# Patient Record
Sex: MALE | Race: WHITE
[De-identification: names, ages, dates, MRNs, and addresses within clinical notes are randomized per-mention and may not be internally consistent; named-entity substitution may affect disease eponyms.]

---

## 2020-06-11 ENCOUNTER — HOSPITAL ENCOUNTER (OUTPATIENT)
Dept: HOSPITAL 53 - M WUC | Age: 85
End: 2020-06-11
Attending: PHYSICIAN ASSISTANT
Payer: COMMERCIAL

## 2020-06-11 DIAGNOSIS — J90: ICD-10-CM

## 2020-06-11 DIAGNOSIS — J18.9: Primary | ICD-10-CM

## 2020-06-11 LAB
ALBUMIN SERPL BCG-MCNC: 3.5 GM/DL (ref 3.2–5.2)
ALT SERPL W P-5'-P-CCNC: 26 U/L (ref 12–78)
BASOPHILS # BLD AUTO: 0 10^3/UL (ref 0–0.2)
BASOPHILS NFR BLD AUTO: 0.7 % (ref 0–1)
BILIRUB SERPL-MCNC: 0.9 MG/DL (ref 0.2–1)
BUN SERPL-MCNC: 22 MG/DL (ref 7–18)
CALCIUM SERPL-MCNC: 8.9 MG/DL (ref 8.8–10.2)
CHLORIDE SERPL-SCNC: 106 MEQ/L (ref 98–107)
CO2 SERPL-SCNC: 28 MEQ/L (ref 21–32)
CREAT SERPL-MCNC: 1.16 MG/DL (ref 0.7–1.3)
EOSINOPHIL # BLD AUTO: 0.1 10^3/UL (ref 0–0.5)
EOSINOPHIL NFR BLD AUTO: 2.3 % (ref 0–3)
GFR SERPL CREATININE-BSD FRML MDRD: > 60 ML/MIN/{1.73_M2} (ref 35–?)
GLUCOSE SERPL-MCNC: 92 MG/DL (ref 70–100)
HCT VFR BLD AUTO: 39.1 % (ref 42–52)
HGB BLD-MCNC: 13.1 G/DL (ref 13.5–17.5)
LYMPHOCYTES # BLD AUTO: 1.1 10^3/UL (ref 1.5–5)
LYMPHOCYTES NFR BLD AUTO: 19.9 % (ref 24–44)
MAGNESIUM SERPL-MCNC: 2.2 MG/DL (ref 1.8–2.4)
MCH RBC QN AUTO: 31.4 PG (ref 27–33)
MCHC RBC AUTO-ENTMCNC: 33.5 G/DL (ref 32–36.5)
MCV RBC AUTO: 93.8 FL (ref 80–96)
MONOCYTES # BLD AUTO: 0.5 10^3/UL (ref 0–0.8)
MONOCYTES NFR BLD AUTO: 9.3 % (ref 0–5)
NEUTROPHILS # BLD AUTO: 3.9 10^3/UL (ref 1.5–8.5)
NEUTROPHILS NFR BLD AUTO: 67.6 % (ref 36–66)
NT-PRO BNP: 2388 PG/ML (ref ?–450)
PLATELET # BLD AUTO: 193 10^3/UL (ref 150–450)
POTASSIUM SERPL-SCNC: 4.4 MEQ/L (ref 3.5–5.1)
PROT SERPL-MCNC: 6.7 GM/DL (ref 6.4–8.2)
RBC # BLD AUTO: 4.17 10^6/UL (ref 4.3–6.1)
SODIUM SERPL-SCNC: 138 MEQ/L (ref 136–145)
WBC # BLD AUTO: 5.7 10^3/UL (ref 4–10)

## 2020-06-11 PROCEDURE — 80053 COMPREHEN METABOLIC PANEL: CPT

## 2020-06-11 PROCEDURE — 83880 ASSAY OF NATRIURETIC PEPTIDE: CPT

## 2020-06-11 PROCEDURE — 71046 X-RAY EXAM CHEST 2 VIEWS: CPT

## 2020-06-11 PROCEDURE — 83735 ASSAY OF MAGNESIUM: CPT

## 2020-06-11 PROCEDURE — 85025 COMPLETE CBC W/AUTO DIFF WBC: CPT

## 2020-06-11 PROCEDURE — 36415 COLL VENOUS BLD VENIPUNCTURE: CPT

## 2020-06-11 NOTE — REP
Two-view chest:  06/11/2020.

 

Indication:  Dyspnea.

 

Comparison:  05/19/2016.

 

Findings:  There is a right lower lobe opacity.  Tiny right pleural effusion is

present.  There is no pneumothorax.  Cardiac silhouette is normal in size.

 

Impression:

 

Right lower lobe pneumonia and tiny pleural effusion.

 

 

Electronically Signed by

Enzo Schuster DO 06/11/2020 03:34 P

## 2020-06-16 ENCOUNTER — HOSPITAL ENCOUNTER (EMERGENCY)
Dept: HOSPITAL 53 - M ED | Age: 85
Discharge: HOME | End: 2020-06-16
Payer: COMMERCIAL

## 2020-06-16 VITALS — WEIGHT: 155.32 LBS | BODY MASS INDEX: 23.54 KG/M2 | HEIGHT: 68 IN

## 2020-06-16 VITALS — DIASTOLIC BLOOD PRESSURE: 93 MMHG | SYSTOLIC BLOOD PRESSURE: 148 MMHG

## 2020-06-16 DIAGNOSIS — N40.1: ICD-10-CM

## 2020-06-16 DIAGNOSIS — I48.91: ICD-10-CM

## 2020-06-16 DIAGNOSIS — R33.9: Primary | ICD-10-CM

## 2020-06-16 DIAGNOSIS — Z79.01: ICD-10-CM

## 2020-06-16 DIAGNOSIS — Z79.899: ICD-10-CM

## 2020-06-16 LAB
BASOPHILS # BLD AUTO: 0.1 10^3/UL (ref 0–0.2)
BASOPHILS NFR BLD AUTO: 1 % (ref 0–1)
EOSINOPHIL # BLD AUTO: 0.1 10^3/UL (ref 0–0.5)
EOSINOPHIL NFR BLD AUTO: 2.1 % (ref 0–3)
HCT VFR BLD AUTO: 43.8 % (ref 42–52)
HGB BLD-MCNC: 14.3 G/DL (ref 13.5–17.5)
LYMPHOCYTES # BLD AUTO: 1.3 10^3/UL (ref 1.5–5)
LYMPHOCYTES NFR BLD AUTO: 18.7 % (ref 24–44)
MCH RBC QN AUTO: 30.7 PG (ref 27–33)
MCHC RBC AUTO-ENTMCNC: 32.6 G/DL (ref 32–36.5)
MCV RBC AUTO: 94 FL (ref 80–96)
MONOCYTES # BLD AUTO: 0.6 10^3/UL (ref 0–0.8)
MONOCYTES NFR BLD AUTO: 9 % (ref 0–5)
NEUTROPHILS # BLD AUTO: 4.7 10^3/UL (ref 1.5–8.5)
NEUTROPHILS NFR BLD AUTO: 68.9 % (ref 36–66)
PLATELET # BLD AUTO: 219 10^3/UL (ref 150–450)
RBC # BLD AUTO: 4.66 10^6/UL (ref 4.3–6.1)
WBC # BLD AUTO: 6.8 10^3/UL (ref 4–10)

## 2020-06-16 NOTE — REP
Clinical:  Flank pain and urinary retention.

 

Technique:  Real time gray scale ultrasound examination using curved array

transducer.

 

Findings:

The bilateral kidneys are normal in contour, size, echogenicity, and reniform

shape.  No hydronephrosis, nephrolithiasis, cystic or renal mass lesion.  Right

kidney measures 10.6 x 5.1 x 4.5 cm.  Left kidney measures 10.0 x 4.9 x 6.1 cm.

 

Impression:

Normal renal ultrasound.

 

 

Electronically Signed by

Deny Diaz MD 06/16/2020 10:27 A

## 2020-06-16 NOTE — REP
Clinical:  Flank pain and urinary retention.

 

Technique:  Real time gray scale and color ultrasound examination using curved

array transducer.

 

Findings:

The bladder is normal in appearance and without wall thickening or mass lesion.

Bilateral ureteral jets are identified.  Prevoid bladder measures 291 ml.

Postvoid images could not be obtained.  The prostate gland is mildly enlarged

measuring 5.7 x 5.0 x 4.8 cm (72 ml).

 

Impression:

Normal appearance to the bladder.

Mild prostatomegaly.

 

 

Electronically Signed by

Deny Diaz MD 06/16/2020 10:29 A

## 2020-06-16 NOTE — ECGEPIP
[facility] - ED

                                       

                                       Test Date:    2020

Pat Name:     JOE ROGERS            Department:   

Patient ID:   [id number]                 Room:         -

Gender:       Male                     Technician:   sally

:          1934               Requested By: WOODROW PAGE PA-C.

Order Number: [id number]     Reading MD:   Rojelio Bejarano

                                 Measurements

Intervals                              Axis          

Rate:         96                       P:            

FL:           0                        QRS:          14

QRSD:         83                       T:            -11

QT:           353                                    

QTc:          446                                    

                           Interpretive Statements

ATRIAL FLUTTER

NONSPECIFIC T-WAVE ABNORMALITY

NO PRIORS FOR COMPARISON

Electronically Signed on 2020 21:40:44 EDT by Rojelio Bejarano

## 2020-06-19 ENCOUNTER — HOSPITAL ENCOUNTER (EMERGENCY)
Dept: HOSPITAL 53 - M ED | Age: 85
Discharge: HOME | End: 2020-06-19
Payer: COMMERCIAL

## 2020-06-19 VITALS — BODY MASS INDEX: 24.36 KG/M2 | WEIGHT: 160.72 LBS | HEIGHT: 68 IN

## 2020-06-19 VITALS — SYSTOLIC BLOOD PRESSURE: 162 MMHG | DIASTOLIC BLOOD PRESSURE: 85 MMHG

## 2020-06-19 DIAGNOSIS — K59.00: Primary | ICD-10-CM

## 2020-06-19 NOTE — REP
KUB:  Single view.

 

History:  Constipation.

 

Findings:  There is some formed stool in the ascending transverse and descending

colon without colonic distension.  No small bowel dilation is seen.  Psoas

margins and flank stripes are intact.  There is some vascular calcification.  No

mass organomegaly is seen.

 

Impression:

 

Normal bowel gas pattern.

 

 

Electronically Signed by

Iggy Vásquez MD 06/19/2020 07:50 A

## 2020-07-10 ENCOUNTER — HOSPITAL ENCOUNTER (EMERGENCY)
Dept: HOSPITAL 53 - M ED | Age: 85
LOS: 1 days | Discharge: HOME | End: 2020-07-11
Payer: COMMERCIAL

## 2020-07-10 DIAGNOSIS — Z79.899: ICD-10-CM

## 2020-07-10 DIAGNOSIS — I48.91: Primary | ICD-10-CM

## 2020-07-10 DIAGNOSIS — Z79.01: ICD-10-CM

## 2020-07-10 DIAGNOSIS — I95.89: ICD-10-CM

## 2020-07-10 LAB
ALBUMIN SERPL BCG-MCNC: 3.2 GM/DL (ref 3.2–5.2)
ALT SERPL W P-5'-P-CCNC: 21 U/L (ref 12–78)
BASOPHILS # BLD AUTO: 0.1 10^3/UL (ref 0–0.2)
BASOPHILS NFR BLD AUTO: 0.9 % (ref 0–1)
BILIRUB CONJ SERPL-MCNC: 0.1 MG/DL (ref 0–0.2)
BILIRUB SERPL-MCNC: 0.7 MG/DL (ref 0.2–1)
CK MB CFR.DF SERPL CALC: 1.51
CK MB SERPL-MCNC: 3.3 NG/ML (ref ?–3.6)
CK SERPL-CCNC: 218 U/L (ref 39–308)
EOSINOPHIL # BLD AUTO: 0.2 10^3/UL (ref 0–0.5)
EOSINOPHIL NFR BLD AUTO: 3.3 % (ref 0–3)
HCT VFR BLD AUTO: 35.6 % (ref 42–52)
HGB BLD-MCNC: 12.1 G/DL (ref 13.5–17.5)
INR PPP: 1.25
LIPASE SERPL-CCNC: 86 U/L (ref 73–393)
LYMPHOCYTES # BLD AUTO: 1.3 10^3/UL (ref 1.5–5)
LYMPHOCYTES NFR BLD AUTO: 18.5 % (ref 24–44)
MCH RBC QN AUTO: 30.8 PG (ref 27–33)
MCHC RBC AUTO-ENTMCNC: 34 G/DL (ref 32–36.5)
MCV RBC AUTO: 90.6 FL (ref 80–96)
MONOCYTES # BLD AUTO: 0.4 10^3/UL (ref 0–0.8)
MONOCYTES NFR BLD AUTO: 6.3 % (ref 0–5)
NEUTROPHILS # BLD AUTO: 4.9 10^3/UL (ref 1.5–8.5)
NEUTROPHILS NFR BLD AUTO: 70.6 % (ref 36–66)
PLATELET # BLD AUTO: 180 10^3/UL (ref 150–450)
PROT SERPL-MCNC: 6.5 GM/DL (ref 6.4–8.2)
PROTHROMBIN TIME: 15.4 SECONDS (ref 11.8–14)
RBC # BLD AUTO: 3.93 10^6/UL (ref 4.3–6.1)
TROPONIN I SERPL-MCNC: 0.02 NG/ML (ref ?–0.1)
WBC # BLD AUTO: 7 10^3/UL (ref 4–10)

## 2020-07-10 PROCEDURE — 94760 N-INVAS EAR/PLS OXIMETRY 1: CPT

## 2020-07-10 PROCEDURE — 80076 HEPATIC FUNCTION PANEL: CPT

## 2020-07-10 PROCEDURE — 83690 ASSAY OF LIPASE: CPT

## 2020-07-10 PROCEDURE — 93005 ELECTROCARDIOGRAM TRACING: CPT

## 2020-07-10 PROCEDURE — 93041 RHYTHM ECG TRACING: CPT

## 2020-07-10 PROCEDURE — 82553 CREATINE MB FRACTION: CPT

## 2020-07-10 PROCEDURE — 84484 ASSAY OF TROPONIN QUANT: CPT

## 2020-07-10 PROCEDURE — 80047 BASIC METABLC PNL IONIZED CA: CPT

## 2020-07-10 PROCEDURE — 82550 ASSAY OF CK (CPK): CPT

## 2020-07-10 PROCEDURE — 74177 CT ABD & PELVIS W/CONTRAST: CPT

## 2020-07-10 PROCEDURE — 96374 THER/PROPH/DIAG INJ IV PUSH: CPT

## 2020-07-10 PROCEDURE — 99285 EMERGENCY DEPT VISIT HI MDM: CPT

## 2020-07-10 PROCEDURE — 96361 HYDRATE IV INFUSION ADD-ON: CPT

## 2020-07-10 PROCEDURE — 85610 PROTHROMBIN TIME: CPT

## 2020-07-10 PROCEDURE — 71045 X-RAY EXAM CHEST 1 VIEW: CPT

## 2020-07-10 PROCEDURE — 85025 COMPLETE CBC W/AUTO DIFF WBC: CPT

## 2020-07-10 PROCEDURE — 83880 ASSAY OF NATRIURETIC PEPTIDE: CPT

## 2020-07-10 NOTE — REPVR
PROCEDURE INFORMATION: 

Exam: CT Abdomen And Pelvis With Contrast 

Exam date and time: 7/10/2020 11:49 PM 

Age: 86 years old 

Clinical indication: Abdominal pain; Generalized; Prior surgery; Surgery date: 

Post-operative (0-2 days); Surgery type: Cystoscopy 



TECHNIQUE: 

Imaging protocol: Computed tomography of the abdomen and pelvis with 

intravenous contrast. 

Radiation optimization: All CT scans at this facility use at least one of these 

dose optimization techniques: automated exposure control; mA and/or kV 

adjustment per patient size (includes targeted exams where dose is matched to 

clinical indication); or iterative reconstruction. 

Contrast material: ISOVUE 370; Contrast volume: 100 ml; Contrast route: 

INTRAVENOUS (IV);  



COMPARISON: 

BLADDER (LIMITED PELVIC) US 6/16/2020 10:08 AM 



FINDINGS: 

Lungs: Interlobular septal thickening at the lung bases. Miniscule right 

pleural effusion. 



Liver: Liver appears normal with no focal abnormality. 

Gallbladder and bile ducts: Gallbladder is present and shows no evidence of 

gallstone. 

Pancreas: Pancreas appears normal. No focal mass or peripancreatic 

inflammation. 

Spleen: Spleen appears homogeneous without focal mass. 

Adrenals: Adrenal glands are normal in appearance. 

Kidneys and ureters: Kidneys appear normal, with no stone, solid mass or 

hydronephrosis. 

Stomach and bowel: Stomach is distended with ingested material and fluid. No 

evidence of small bowel obstruction. No evidence of acute diverticulitis. 

Appendix: Normal caliber appendix is identified, with no adjacent inflammation. 



Intraperitoneal space: No pneumoperitoneum. 

Vasculature: No aortic aneurysm. Main portal and splenic veins enhance 

normally. 

Lymph nodes: No enlarged lymph nodes. 

Bladder: Bladder is somewhat thick-walled, asymmetric to the right of midline. 

Reproductive: Prostate gland is diffusely enlarged. 

Bones/joints: Bony structures are normal except for lumbar spine degenerative 

disc changes. 

Soft tissues: Unremarkable. 



IMPRESSION: 

1. Irregular thick-walled appearance of the bladder. Apparently the patient 

underwent a recent cystoscopy. 

2. No acute bowel process or evidence of obstructive uropathy. 

3. Mild pulmonary edema with small right pleural effusion 



Electronically signed by: Glenn Douglass On 07/10/2020  23:54:24 PM

## 2020-07-10 NOTE — ECGEPIP
[facility] - ED

                                       

                                       Test Date:    2020-07-10

Pat Name:     JOE ROGERS            Department:   

Patient ID:   [id number]                 Room:         -

Gender:       Male                     Technician:   radha

:          1934               Requested By: ARA TEJADA 

Order Number: [id number]     Reading MD:   Leonor Mcnair

                                 Measurements

Intervals                              Axis          

Rate:         82                       P:            

DE:           0                        QRS:          41

QRSD:         98                       T:            44

QT:           402                                    

QTc:          472                                    

                           Interpretive Statements

ATRIAL FIBRILLATION WITH ABERRANT CONDUCTION OR VENTRICULAR PREMATURE

COMPLEXES

ABNORMAL RHYTHM ECG

Electronically Signed on 7- 21:42:00 EDT by Leonor Mcnair

## 2020-07-11 VITALS — DIASTOLIC BLOOD PRESSURE: 85 MMHG | SYSTOLIC BLOOD PRESSURE: 175 MMHG

## 2020-07-11 LAB
CK MB CFR.DF SERPL CALC: 1.81
CK MB SERPL-MCNC: 3.7 NG/ML (ref ?–3.6)
CK SERPL-CCNC: 204 U/L (ref 39–308)
NT-PRO BNP: 1463 PG/ML (ref ?–450)
TROPONIN I SERPL-MCNC: 0.02 NG/ML (ref ?–0.1)

## 2020-07-11 NOTE — ECGEPIP
[facility] - ED

                                       

                                       Test Date:    2020

Pat Name:     JOE ROGERS            Department:   

Patient ID:   [id number]                 Room:         -

Gender:       Male                     Technician:   CLAUDIA

:          1934               Requested By: ARA TEJADA 

Order Number: [id number]     Reading MD:   Maja Lundborg-Gray

                                 Measurements

Intervals                              Axis          

Rate:         86                       P:            

VA:           0                        QRS:          46

QRSD:         93                       T:            29

QT:           390                                    

QTc:          467                                    

                           Interpretive Statements

ATRIAL FIBRILLATION

ABNORMAL RHYTHM ECG

NONSPECIFIC ST T WAVE CHANGES

LOW QRS VOLTAGE LIMB LEADS

 

CW 7/10/20 

RATE INCREASED

NONSPECIFIC ST T WAVE CHANGES

Electronically Signed on 2020 16:04:23 EDT by Maja Lundborg-Gray

## 2020-07-11 NOTE — ED PDOC
Post-Departure Follow-Up


certified letter sent to pt re formal read of ct abd/p - need fu. please obtain 

pcp/cardiology name frompt and fax for fu mlg Lundborg-Gray,Maja MD          Jul 11, 2020 06:38

## 2020-07-12 NOTE — REP
AP PORTABLE CHEST:  07/10/2020.

 

CLINICAL HISTORY:  Chest pain.

 

COMPARISON:  06/11/2020

 

FINDINGS:  The lung fields are adequately inflated.  The CP angles show no gross

effusion.  There are heavier basilar markings but the patchy infiltrate in the

right CP angle on last month's exam is no longer present.  There is no dense

consolidation with air bronchograms.  Heart size unchanged.  There is no vascular

redistribution or pulmonary edema.  The aorta is mildly tortuous but without

aneurysm.  Airway intact.  Bones show some degenerative changes in the spine

without destructive lesion.

 

IMPRESSION:

 

1.  Some basilar fibrotic changes without definite infiltrate, effusion,

cardiomegaly, or edema.  There is clearing of the right CP angle opacity seen on

the previous study suggesting infiltrate.

 

 

Electronically Signed by

Trace Nice MD 07/12/2020 08:46 A
- - -

## 2021-06-30 ENCOUNTER — HOSPITAL ENCOUNTER (OUTPATIENT)
Dept: HOSPITAL 53 - M WUC | Age: 86
End: 2021-06-30
Attending: NURSE PRACTITIONER
Payer: MEDICARE

## 2021-06-30 DIAGNOSIS — M25.512: Primary | ICD-10-CM

## 2021-06-30 NOTE — REP
INDICATION:

PAIN



COMPARISON:

None.



TECHNIQUE:

Internal rotation, external rotation, and Y view.



FINDINGS:

Essentially age-related degenerative changes are appreciated with subtle cortical

irregularity at the acromioclavicular joint.  There is subtle increased sclerosis to

the calcified glenoid rim with possible small fractured osteophyte at the 1 o'clock

position.  The subacromial space is decreased to approximately 7.5 mm.  No evidence

for acute fracture or dislocation.



IMPRESSION:

Essentially age-related degenerative changes as described above.





<Electronically signed by Deny Diaz > 06/30/21 [id number]

## 2021-10-05 ENCOUNTER — HOSPITAL ENCOUNTER (EMERGENCY)
Dept: HOSPITAL 53 - M ED | Age: 86
Discharge: HOME | End: 2021-10-05
Payer: MEDICARE

## 2021-10-05 VITALS — BODY MASS INDEX: 22.25 KG/M2 | HEIGHT: 68 IN | WEIGHT: 146.83 LBS

## 2021-10-05 VITALS — SYSTOLIC BLOOD PRESSURE: 158 MMHG | DIASTOLIC BLOOD PRESSURE: 92 MMHG

## 2021-10-05 DIAGNOSIS — Z87.891: ICD-10-CM

## 2021-10-05 DIAGNOSIS — R33.9: Primary | ICD-10-CM

## 2021-10-05 DIAGNOSIS — Z79.01: ICD-10-CM

## 2021-10-05 DIAGNOSIS — I48.91: ICD-10-CM

## 2021-10-05 DIAGNOSIS — Z79.899: ICD-10-CM

## 2021-10-05 LAB
BUN SERPL-MCNC: 22 MG/DL (ref 7–18)
CALCIUM SERPL-MCNC: 9.8 MG/DL (ref 8.8–10.2)
CHLORIDE SERPL-SCNC: 107 MEQ/L (ref 98–107)
CO2 SERPL-SCNC: 28 MEQ/L (ref 21–32)
CREAT SERPL-MCNC: 1.08 MG/DL (ref 0.7–1.3)
GFR SERPL CREATININE-BSD FRML MDRD: > 60 ML/MIN/{1.73_M2} (ref 35–?)
GLUCOSE SERPL-MCNC: 104 MG/DL (ref 70–100)
POTASSIUM SERPL-SCNC: 4.2 MEQ/L (ref 3.5–5.1)
SODIUM SERPL-SCNC: 139 MEQ/L (ref 136–145)

## 2022-07-13 ENCOUNTER — HOSPITAL ENCOUNTER (OUTPATIENT)
Dept: HOSPITAL 53 - M LAB REF | Age: 87
End: 2022-07-13
Attending: INTERNAL MEDICINE
Payer: MEDICARE

## 2022-07-13 DIAGNOSIS — R41.3: Primary | ICD-10-CM

## 2022-07-13 LAB
FOLATE SERPL-MCNC: 13.2 NG/ML
VIT B12 SERPL-MCNC: 529 PG/ML